# Patient Record
Sex: MALE | Race: WHITE | NOT HISPANIC OR LATINO | URBAN - METROPOLITAN AREA
[De-identification: names, ages, dates, MRNs, and addresses within clinical notes are randomized per-mention and may not be internally consistent; named-entity substitution may affect disease eponyms.]

---

## 2018-08-15 ENCOUNTER — OFFICE VISIT CONVERTED (OUTPATIENT)
Dept: GASTROENTEROLOGY | Facility: CLINIC | Age: 58
End: 2018-08-15
Attending: INTERNAL MEDICINE

## 2019-12-26 ENCOUNTER — HOSPITAL ENCOUNTER (OUTPATIENT)
Dept: MRI IMAGING | Facility: HOSPITAL | Age: 59
Discharge: HOME | End: 2019-12-26
Attending: NEUROLOGICAL SURGERY

## 2020-06-05 ENCOUNTER — OFFICE VISIT CONVERTED (OUTPATIENT)
Dept: UROLOGY | Facility: CLINIC | Age: 60
End: 2020-06-05
Attending: UROLOGY

## 2021-05-10 NOTE — H&P
History and Physical      Patient Name: Cuco Obrien   Patient ID: [id number]   Sex: Male   YOB: 1960    Primary Care Provider: Esdras Horta   Referring Provider: Esdras Horta    Visit Date: June 5, 2020    Provider: Gab Holland MD   Location: [facility]   Location Address: [address] [place] [address]  [id number]   Location Phone: [phone]          Chief Complaint  · pt here for urologic issues      History Of Present Illness     60-year-old gentleman who has had trouble with prostatitis in the past.  He is also followed by Dr. Ordonez in the past for BPH with LUTS. Here today for prostatitis    Good stream.  No trouble with initiation or intermittency of stream. Nocturia X  3.  Some urgency or frequency.  No incontinence.  No prostate meds    About 1 week is having suprapubic pain.  Worse after voiding.  Is also having some discharge is burning at times.  No pain in the penis or perineum.    5/17 renal ultrasoundnegative    Took Cipro several years ago for prostatitis it did seem to help.    5/20 creatinine 0.7,     Rapaflo -trans-would not cover    Can have intermittent hematospermia.    2 UTI in his life     Patient has had no gross hematuria, dysuria or recurrent urinary tract infections.  No history of kidney stone.    No urologic family history,   Has never had any urologic surgery.    No cardiopulmonary history.  Patient does not smoke.  advil. Dealing with a lot of back pain      He is doing prostate cancer screening, patient states these of been low and normal       Past Medical History  Hypoglycemia         Past Surgical History  Back surgery; Bronchoscopy; Colonoscopy; Cystoscopy; EGD         Medication List  Advil 200 mg oral tablet; atenolol 25 mg oral tablet; baclofen 20 mg oral tablet; cetirizine 10 mg oral tablet; citalopram 20 mg oral tablet; gabapentin 300 mg oral capsule; hydrocodone-acetaminophen 7.5-325 mg oral tablet         Allergy  List  Demerol; SULFA (SULFONAMIDES)         Family Medical History  Heart Disease; - No Family History of Colorectal Cancer; Diabetes         Social History  Alcohol (Never); Tobacco (Never)         Review of Systems  · Constitutional  o Denies  o : chills  · Gastrointestinal  o Denies  o : nausea      Vitals  Date Time BP Position Site L\R Cuff Size HR RR TEMP (F) WT  HT  BMI kg/m2 BSA m2 O2 Sat        06/05/2020 10:32 AM       17  311lbs 0oz 6'   42.18 2.68           Physical Examination  · Constitutional  o Appearance  o : Well-appearing, well-developed, in no acute distress  · Respiratory  o Respiratory Effort  o : Unlabored breathing  · Neurologic  o Mental Status Examination  o :   § Orientation  § : Grossly oriented to person, place and time, judgment and insight intact, normal mood and affect          Results  · In-Office Procedures  o Lab procedure  § Automated Dipstick Urinalysis (Surg Spec) WITHOUT Micro [facility] (71218)   § Color Ur: Dark yellow   § Clarity Ur: Clear   § Glucose Ur Ql Strip: Negative   § Bilirub Ur Ql Strip: Small   § Ketones Ur Ql Strip: Negative   § Sp Gr Ur Qn: 1.020   § Hgb Ur Ql Strip: Negative   § pH Ur-LsCnc: 7.0   § Prot Ur Ql Strip: Negative   § Urobilinogen Ur Strip-mCnc: 1.0 E.U./dL   § Nitrite Ur Ql Strip: Negative   § WBC Est Ur Ql Strip: Negative       Assessment  · Prostatitis     601.9/N41.9    Problems Reconciled  Plan  · Medications  o Medications have been Reconciled  o Transition of Care or Provider Policy  · Instructions  o Electronically Identified Patient Education Materials Provided Electronically       Very bothered by his symptoms.  After discussion we will place him on doxycycline 100 mg p.o. twice daily x28 days.  Risk/benefits and side effects of long-term antibiotics were discussed with patient he accepts these risk.      Telehealth visit in 1 month    Greater than 15 minutes was used in counseling and coordination of care, with greater than 51% of this in  face-to-face counseling             Electronically Signed by: Gab Holland MD -Author on June 5, 2020 10:47:00 AM

## 2021-05-15 VITALS — BODY MASS INDEX: 42.12 KG/M2 | RESPIRATION RATE: 17 BRPM | HEIGHT: 72 IN | WEIGHT: 311 LBS

## 2021-05-16 VITALS
BODY MASS INDEX: 41.58 KG/M2 | HEIGHT: 72 IN | WEIGHT: 307 LBS | HEART RATE: 67 BPM | SYSTOLIC BLOOD PRESSURE: 122 MMHG | OXYGEN SATURATION: 98 % | DIASTOLIC BLOOD PRESSURE: 60 MMHG